# Patient Record
(demographics unavailable — no encounter records)

---

## 2025-01-09 NOTE — DATA REVIEWED
[FreeTextEntry1] : Patient Name HOLLY TRAN Date of Birth 1967-07-25 Procedure MA DIGITAL MAMMOGRAPHY( RIGHT BREAST) DI Study Date & Time 2024-12-11 1:31 PM Patient ID [id number] Accession Number [id number] Referring Physician RIVKA TIDWELL Institution Name [facility] RADIOLOGY REPORT FINDINGS FINDING History: 57 years old female for evaluation of calcifications seen in the right breast. CC and ML digital magnification views of the right breast was obtained in addition to ML projection. The images were processed by CAD, computer-aided detection system. Last reported clinical breast exam: Within the past year. Breast Cancer Risk: NCI 5 year:3.6%; NCI Lifetime:16.0%. Comparison: 11/25/2024, 2/10/2021 and 1/3/2020.. The breast tissue is heterogeneously dense, which may obscure small masses. There are very faint calcifications in the posterior lateral aspect of the left breast for which attempted biopsy is recommended. There may be additional calcifications along the medial region which are less discrete. If biopsy is benign, follow-up diagnostic mammogram in 6 months may be performed to document stability. Impression: Faint calcifications are seen in the posterior lateral aspect of the right breast for which attempted stereotactic guided biopsy with clip placement is recommended. (Stereotactic, MR and ultrasound-guided breast biopsy is available at our offices.) If biopsy is benign, follow-up diagnostic right mammogram in 6 months for additional calcifications is recommended. Pre and postcontrast enhanced MRI of the breast may be considered for further evaluation, given the faint nature of the calcifications and possible difficulty with biopsy. Message will be sent to MD through the [facility] urgent finding feature. BI-RADS CATEGORY: 4 - Suspicious Abnormality Recommendation: Stereotactic Biopsy.  A letter will been sent to the patient with the above recommendations. Approximately 10% of breast carcinomas are not radiographically detectable. A negative report should not delay biopsy if a clinically suspicious mass is present. Dense breasts may obscure an underlying neoplasm. Patient has been added into a reminder system with a target due date for their next mammogram. BIRADS 1: Negative BIRADS 2: Benign BIRADS 3: Probably Benign Finding. Short interval follow up suggested. BIRADS 4: Suspicious Abnormality. Biopsy recommended. BIRADS 5: Highly Suggestive of Malignancy. Appropriate action should be taken. BIRADS 6: Known Biopsy Proven Malignancy. Appropriate action should be taken. BIRADS 0: Incomplete - Need Additional Imaging Evaluation and/or Prior Mammograms for Comparison The [place] Department of Health requires us to indicate the date of the patient's last clinical breast examination. Electronically signed by: Abhishek Lincoln MD 12/11/2024 2:27 PM Workstation: [id number] Electronically Signed By: Abhishek Lincoln MD Sign Date: 11-DEC-24 [facility]

## 2025-01-09 NOTE — PHYSICAL EXAM
[Alert] : alert [Oriented to Person] : oriented to person [Oriented to Place] : oriented to place [Oriented to Time] : oriented to time [Calm] : calm [de-identified] : She  is alert, well-groomed, and in NAD   [de-identified] : Neck supple. Trachea midline. Thyroid isthmus barely palpable, lobes not felt.   [de-identified] : anicteric.  Nasal mucosa pink, septum midline. Oral mucosa pink.  Tongue midline, Pharynx without exudates.   [de-identified] :   Breast are symmetric,  No palpable nodules, masses, tenderness, or axillary or supraclavicular adenopathy. No nipple discharge. no skin retraction

## 2025-01-09 NOTE — PHYSICAL EXAM
[Alert] : alert [Oriented to Person] : oriented to person [Oriented to Place] : oriented to place [Oriented to Time] : oriented to time [Calm] : calm [de-identified] : She  is alert, well-groomed, and in NAD   [de-identified] : Neck supple. Trachea midline. Thyroid isthmus barely palpable, lobes not felt.   [de-identified] : anicteric.  Nasal mucosa pink, septum midline. Oral mucosa pink.  Tongue midline, Pharynx without exudates.   [de-identified] :   Breast are symmetric,  No palpable nodules, masses, tenderness, or axillary or supraclavicular adenopathy. No nipple discharge. no skin retraction

## 2025-01-09 NOTE — PLAN
[FreeTextEntry1] : Ms. SHAH  is presenting  today for an evaluation .  she is doing well and offers no complaints.  Results of  her last   imaging and physical examination findings were discussed in details.  Significance of the findings were discussed.    She  was advised to have   right breast   stereotactic biopsy and return after the tests. Importance of monthly self-breast examination was reinforced.  Patient's questions and concerns addressed to patient's satisfaction.

## 2025-01-09 NOTE — DATA REVIEWED
[FreeTextEntry1] : Patient Name HOLLY TRAN Date of Birth 1967-07-25 Procedure MA DIGITAL MAMMOGRAPHY( RIGHT BREAST) DI Study Date & Time 2024-12-11 1:31 PM Patient ID [id number] Accession Number [id number] Referring Physician RIVKA TIDWELL Institution Name [facility] RADIOLOGY REPORT FINDINGS FINDING History: 57 years old female for evaluation of calcifications seen in the right breast. CC and ML digital magnification views of the right breast was obtained in addition to ML projection. The images were processed by CAD, computer-aided detection system. Last reported clinical breast exam: Within the past year. Breast Cancer Risk: NCI 5 year:3.6%; NCI Lifetime:16.0%. Comparison: 11/25/2024, 2/10/2021 and 1/3/2020.. The breast tissue is heterogeneously dense, which may obscure small masses. There are very faint calcifications in the posterior lateral aspect of the left breast for which attempted biopsy is recommended. There may be additional calcifications along the medial region which are less discrete. If biopsy is benign, follow-up diagnostic mammogram in 6 months may be performed to document stability. Impression: Faint calcifications are seen in the posterior lateral aspect of the right breast for which attempted stereotactic guided biopsy with clip placement is recommended. (Stereotactic, MR and ultrasound-guided breast biopsy is available at our offices.) If biopsy is benign, follow-up diagnostic right mammogram in 6 months for additional calcifications is recommended. Pre and postcontrast enhanced MRI of the breast may be considered for further evaluation, given the faint nature of the calcifications and possible difficulty with biopsy. Message will be sent to MD through the [facility] urgent finding feature. BI-RADS CATEGORY: 4 - Suspicious Abnormality Recommendation: Stereotactic Biopsy.  A letter will been sent to the patient with the above recommendations. Approximately 10% of breast carcinomas are not radiographically detectable. A negative report should not delay biopsy if a clinically suspicious mass is present. Dense breasts may obscure an underlying neoplasm. Patient has been added into a reminder system with a target due date for their next mammogram. BIRADS 1: Negative BIRADS 2: Benign BIRADS 3: Probably Benign Finding. Short interval follow up suggested. BIRADS 4: Suspicious Abnormality. Biopsy recommended. BIRADS 5: Highly Suggestive of Malignancy. Appropriate action should be taken. BIRADS 6: Known Biopsy Proven Malignancy. Appropriate action should be taken. BIRADS 0: Incomplete - Need Additional Imaging Evaluation and/or Prior Mammograms for Comparison The [place] Department of Health requires us to indicate the date of the patient's last clinical breast examination. Electronically signed by: Abhishek Lincoln MD 12/11/2024 2:27 PM Workstation: [id number] Electronically Signed By: Abhishek Lincoln MD Sign Date: 11-DEC-24 [facility]

## 2025-01-09 NOTE — HISTORY OF PRESENT ILLNESS
[de-identified] :      Patient  had a BL breast US and a  mammogram on 11/25/2024,   that was deemed a BIRADS 0.     Patient  had a  right   mammogram on 12/11/2024,   that was deemed a BIRADS  4 and showed Faint calcifications are seen in the posterior lateral aspect of the right breast.  [de-identified] :  Patient is a 57 year -old female  who was referred by Dr. Kassidy Hinds with the chief complaint of having a  right breast mass.  She  denies any trauma to the breast, denies  skin changes  and She has no spontaneous nipple discharge. She  denies any fever, night sweats or loss of appetite.    There is no family history of breast carcinoma.   Menarche at age 12, -0, para-0.   Date of  her last menstrual period: age 53.   Patient is on no hormonal replacement therapy.  Patient  had a BL breast US and a  mammogram on 2024,   that was deemed a BIRADS 0.     Patient  had a  right   mammogram on 2024,   that was deemed a BIRADS  4 and showed Faint calcifications are seen in the posterior lateral aspect of the right breast.

## 2025-01-24 NOTE — PLAN
[FreeTextEntry1] :  Patient screened for depression. No signs of clinical depression. PHQ-9 scores reviewed over the course of the visit and 5-10 minutes of face to face time spent. Follow up with changes in mood including other symptoms of anxiety. Breast biopsy next week

## 2025-01-24 NOTE — HISTORY OF PRESENT ILLNESS
[FreeTextEntry1] : Patient getting breast biopsy next week No other issues  [Patient reported PAP Smear was normal] : Patient reported PAP Smear was normal [Patient reported colonoscopy was normal] : Patient reported colonoscopy was normal [Mammogramdate] : 2024 [TextBox_19] : getting biopsy  [PapSmeardate] : 2023 [ColonoscopyDate] : UTD